# Patient Record
Sex: MALE | Race: WHITE | NOT HISPANIC OR LATINO | URBAN - METROPOLITAN AREA
[De-identification: names, ages, dates, MRNs, and addresses within clinical notes are randomized per-mention and may not be internally consistent; named-entity substitution may affect disease eponyms.]

---

## 2017-01-13 ENCOUNTER — AMBULATORY SURGICAL CENTER (OUTPATIENT)
Dept: URBAN - METROPOLITAN AREA SURGERY 3 | Facility: SURGERY | Age: 27
End: 2017-01-13
Payer: COMMERCIAL

## 2017-01-13 ENCOUNTER — OFFICE (OUTPATIENT)
Dept: URBAN - METROPOLITAN AREA CLINIC 11 | Facility: CLINIC | Age: 27
End: 2017-01-13

## 2017-01-13 ENCOUNTER — AMBULATORY SURGICAL CENTER (OUTPATIENT)
Dept: URBAN - METROPOLITAN AREA SURGERY 3 | Facility: SURGERY | Age: 27
End: 2017-01-13

## 2017-01-13 VITALS
WEIGHT: 227 LBS | HEART RATE: 55 BPM | SYSTOLIC BLOOD PRESSURE: 111 MMHG | RESPIRATION RATE: 16 BRPM | HEART RATE: 55 BPM | RESPIRATION RATE: 14 BRPM | RESPIRATION RATE: 16 BRPM | DIASTOLIC BLOOD PRESSURE: 62 MMHG | DIASTOLIC BLOOD PRESSURE: 82 MMHG | OXYGEN SATURATION: 98 % | OXYGEN SATURATION: 97 % | TEMPERATURE: 96.7 F | HEART RATE: 54 BPM | HEART RATE: 63 BPM | TEMPERATURE: 97.8 F | RESPIRATION RATE: 18 BRPM | HEART RATE: 53 BPM | HEIGHT: 73 IN | DIASTOLIC BLOOD PRESSURE: 75 MMHG | DIASTOLIC BLOOD PRESSURE: 82 MMHG | HEART RATE: 54 BPM | RESPIRATION RATE: 18 BRPM | SYSTOLIC BLOOD PRESSURE: 111 MMHG | TEMPERATURE: 96.7 F | HEART RATE: 53 BPM | RESPIRATION RATE: 14 BRPM | OXYGEN SATURATION: 97 % | SYSTOLIC BLOOD PRESSURE: 126 MMHG | DIASTOLIC BLOOD PRESSURE: 52 MMHG | DIASTOLIC BLOOD PRESSURE: 65 MMHG | SYSTOLIC BLOOD PRESSURE: 126 MMHG | TEMPERATURE: 97.8 F | HEIGHT: 73 IN | DIASTOLIC BLOOD PRESSURE: 62 MMHG | HEART RATE: 62 BPM | DIASTOLIC BLOOD PRESSURE: 75 MMHG | SYSTOLIC BLOOD PRESSURE: 139 MMHG | DIASTOLIC BLOOD PRESSURE: 52 MMHG | OXYGEN SATURATION: 98 % | SYSTOLIC BLOOD PRESSURE: 139 MMHG | HEART RATE: 62 BPM | WEIGHT: 227 LBS | HEART RATE: 63 BPM | DIASTOLIC BLOOD PRESSURE: 65 MMHG

## 2017-01-13 DIAGNOSIS — K62.5 HEMORRHAGE OF ANUS AND RECTUM: ICD-10-CM

## 2017-01-13 DIAGNOSIS — K64.1 SECOND DEGREE HEMORRHOIDS: ICD-10-CM

## 2017-01-13 PROCEDURE — 45331 SIGMOIDOSCOPY AND BIOPSY: CPT | Performed by: INTERNAL MEDICINE

## 2017-01-13 PROCEDURE — 88305 TISSUE EXAM BY PATHOLOGIST: CPT | Performed by: INTERNAL MEDICINE

## 2022-12-27 ENCOUNTER — APPOINTMENT (RX ONLY)
Dept: URBAN - METROPOLITAN AREA CLINIC 74 | Facility: CLINIC | Age: 32
Setting detail: DERMATOLOGY
End: 2022-12-27

## 2022-12-27 DIAGNOSIS — D492 NEOPLASM OF UNSPECIFIED NATURE OF BONE, SOFT TISSUE, AND SKIN: ICD-10-CM

## 2022-12-27 DIAGNOSIS — L30.9 DERMATITIS, UNSPECIFIED: ICD-10-CM

## 2022-12-27 PROBLEM — R22.2 LOCALIZED SWELLING, MASS AND LUMP, TRUNK: Status: ACTIVE | Noted: 2022-12-27

## 2022-12-27 PROCEDURE — ? FULL BODY SKIN EXAM - DECLINED

## 2022-12-27 PROCEDURE — 99204 OFFICE O/P NEW MOD 45 MIN: CPT

## 2022-12-27 PROCEDURE — ? SURGICAL DECISION MAKING

## 2022-12-27 PROCEDURE — ? PRESCRIPTION MEDICATION MANAGEMENT

## 2022-12-27 PROCEDURE — ? ADDITIONAL NOTES

## 2022-12-27 PROCEDURE — ? COUNSELING

## 2022-12-27 PROCEDURE — ? PRESCRIPTION

## 2022-12-27 RX ORDER — HYDROCORTISONE 25 MG/G
CREAM TOPICAL
Qty: 30 | Refills: 0 | Status: ERX | COMMUNITY
Start: 2022-12-27

## 2022-12-27 RX ORDER — CICLOPIROX OLAMINE 7.7 MG/G
CREAM TOPICAL
Qty: 30 | Refills: 0 | Status: ERX | COMMUNITY
Start: 2022-12-27

## 2022-12-27 RX ADMIN — CICLOPIROX OLAMINE: 7.7 CREAM TOPICAL at 00:00

## 2022-12-27 RX ADMIN — HYDROCORTISONE: 25 CREAM TOPICAL at 00:00

## 2022-12-27 ASSESSMENT — LOCATION DETAILED DESCRIPTION DERM
LOCATION DETAILED: RIGHT DORSAL SHAFT OF PENIS
LOCATION DETAILED: PERIUMBILICAL SKIN
LOCATION DETAILED: SUPRAPUBIC SKIN

## 2022-12-27 ASSESSMENT — LOCATION ZONE DERM
LOCATION ZONE: TRUNK
LOCATION ZONE: PENIS

## 2022-12-27 ASSESSMENT — LOCATION SIMPLE DESCRIPTION DERM
LOCATION SIMPLE: ABDOMEN
LOCATION SIMPLE: GROIN
LOCATION SIMPLE: PENIS

## 2022-12-27 NOTE — PROCEDURE: ADDITIONAL NOTES
Detail Level: Simple
Render Risk Assessment In Note?: no
Additional Notes: Patient states he has over 5 CT scans in the last week with significant contrast load and has not been feeling well since. Counseled certain rashes like FDE that likes to form on glans penis can be related to a number of medications, counseled appearance is not consistent with that at this time, but will monitor. 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 Rash contained to folds. Will empirically treat for intertrigo with ciclopirox and HC2.5% counseled on overuse of HC and skin thinning. If not improved discussed biopsy. Patient counseled skin lesions are not likely the source of his other concerns. Advised to seek urgent PCP appointment or return to ED if concerned. Patient noted weight loss, SOB and night sweats. Patient has been to several doctors and counseled on the importance of following up with PCP to review all tests that have been done to prevent over testing and exposure to more contrast. Offered to order screening labs but due to concern of other systemic systems stressed importance of seeing an internist or primary care doctor for follow up. Patient agreeed. Noted to have minor shotty notes bilateral inguinal folds. Recommend US and PCP evaluation.
Additional Notes: Counseled lesions are more superficial than expected for lymph nodes and more likely to be several lipomas that are now noted due to weight loss. However unable to tell until surgically examining specimen. Recommend US with pcp to evaluate before surgical procedure as part of further workup for patients concerning symptoms. Patient advised to see pcp asap for weight loss, sob and fatigue. Patient is worried about lymphoma and advised this needs to be evaluated. Patient is established with pcp. Offered to order us today but due to multiple other concerns that seem more pressing recommend PCP follow up so all required tests can be ordered together.

## 2022-12-27 NOTE — HPI: SKIN LESION
What Type Of Note Output Would You Prefer (Optional)?: Bullet Format
How Severe Is Your Skin Lesion?: mild
Has Your Skin Lesion Been Treated?: not been treated
Is This A New Presentation, Or A Follow-Up?: Growth
Additional History: 35 pounds over 3 months, not intentional.

## 2022-12-27 NOTE — PROCEDURE: PRESCRIPTION MEDICATION MANAGEMENT
Render In Strict Bullet Format?: No
Initiate Treatment: hydrocortisone 2.5 % topical cream \\nQuantity: 30.0 g  Days Supply: 30\\nSig: Mix pea sized amount with cicloprox cream and apply affected area 1-2 times a day x 2 weeks,\\n\\nciclopirox 0.77 % topical cream \\nQuantity: 90.0 g  Days Supply: 30\\nSig: Apply tin coat to affected areas 1-2 times a day after cleansing.
Detail Level: Zone

## 2022-12-27 NOTE — PROCEDURE: SURGICAL DECISION MAKING
Risk Assessment Explanation (Does Not Render In The Note): Clinical determination of the probability and/or consequences of an event, such as surgery. Clinical assessment of the level of risk is affected by the nature of the event under consideration for the patient. Modifier 57 is used to indicate an Evaluation and Management (E/M) service resulted in the initial decision to perform surgery either the day before a major surgery (90 day global) or the day of a major surgery.
Discussion: All benefits and risks of procedure were discussed including but not limited to pain, bleeding, infection, scarring, burning, nerve damage, pigmentary change, tendon damage, loss of function.
Initial Decision For Surgery?: Yes
Complexity (Necessary For Coding; Major - 90 Day Global With Some Exceptions; Minor - 10 Day Global): minor

## 2023-01-17 ENCOUNTER — APPOINTMENT (RX ONLY)
Dept: URBAN - METROPOLITAN AREA CLINIC 74 | Facility: CLINIC | Age: 33
Setting detail: DERMATOLOGY
End: 2023-01-17

## 2023-01-17 DIAGNOSIS — L21.8 OTHER SEBORRHEIC DERMATITIS: ICD-10-CM

## 2023-01-17 DIAGNOSIS — L30.9 DERMATITIS, UNSPECIFIED: ICD-10-CM | Status: RESOLVING

## 2023-01-17 DIAGNOSIS — D492 NEOPLASM OF UNSPECIFIED NATURE OF BONE, SOFT TISSUE, AND SKIN: ICD-10-CM

## 2023-01-17 PROBLEM — R22.42 LOCALIZED SWELLING, MASS AND LUMP, LEFT LOWER LIMB: Status: ACTIVE | Noted: 2023-01-17

## 2023-01-17 PROBLEM — R22.41 LOCALIZED SWELLING, MASS AND LUMP, RIGHT LOWER LIMB: Status: ACTIVE | Noted: 2023-01-17

## 2023-01-17 PROBLEM — R22.2 LOCALIZED SWELLING, MASS AND LUMP, TRUNK: Status: ACTIVE | Noted: 2023-01-17

## 2023-01-17 PROCEDURE — ? COUNSELING

## 2023-01-17 PROCEDURE — ? PRESCRIPTION MEDICATION MANAGEMENT

## 2023-01-17 PROCEDURE — ? ADDITIONAL NOTES

## 2023-01-17 PROCEDURE — ? FULL BODY SKIN EXAM - DECLINED

## 2023-01-17 PROCEDURE — ? ORDER ULTRASOUND

## 2023-01-17 PROCEDURE — ? SURGICAL DECISION MAKING

## 2023-01-17 PROCEDURE — 99214 OFFICE O/P EST MOD 30 MIN: CPT

## 2023-01-17 ASSESSMENT — LOCATION DETAILED DESCRIPTION DERM
LOCATION DETAILED: SUPRAPUBIC SKIN
LOCATION DETAILED: LEFT INFERIOR MEDIAL MALAR CHEEK
LOCATION DETAILED: PERIUMBILICAL SKIN
LOCATION DETAILED: RIGHT ANTERIOR PROXIMAL THIGH
LOCATION DETAILED: LEFT ANTERIOR PROXIMAL THIGH
LOCATION DETAILED: RIGHT DORSAL SHAFT OF PENIS
LOCATION DETAILED: LEFT INGUINAL CREASE

## 2023-01-17 ASSESSMENT — LOCATION SIMPLE DESCRIPTION DERM
LOCATION SIMPLE: ABDOMEN
LOCATION SIMPLE: LEFT THIGH
LOCATION SIMPLE: RIGHT THIGH
LOCATION SIMPLE: LEFT CHEEK
LOCATION SIMPLE: GROIN
LOCATION SIMPLE: PENIS

## 2023-01-17 ASSESSMENT — LOCATION ZONE DERM
LOCATION ZONE: FACE
LOCATION ZONE: TRUNK
LOCATION ZONE: PENIS
LOCATION ZONE: LEG

## 2023-01-17 NOTE — PROCEDURE: ADDITIONAL NOTES
Additional Notes: Patient still feeling unwell and losing weight. Has follow up with [facility] for second option counseled weight loss is concerning and glad her is getting another opinion. Counseled lesions don’t appear to be lymph nodes and more likely to be fibro fatty tissue or small lipomas. Counseled may be more able to feel these because of weight loss. Counseled will order ultrasound to avoid any contrast exposure but ultrasound is not a perfect test. Additional Notes: Patient still feeling unwell and losing weight. Has follow up with [facility] for second option counseled weight loss is concerning and glad her is getting another opinion. Counseled lesions don’t appear to be lymph nodes and more likely to be fibro fatty tissue or small lipomas. Counseled may be more able to feel these because of weight loss. Counseled will order ultrasound to avoid any contrast exposure but ultrasound is not a perfect test.

## 2023-01-17 NOTE — PROCEDURE: PRESCRIPTION MEDICATION MANAGEMENT
Render In Strict Bullet Format?: No
Continue Regimen: ciclopirox 0.77 % topical cream  to inguinal folds.
Discontinue Regimen: HC 2.5%
Plan: Skin on glans looks completely normal today. Recommend discontinuing all treatments
Detail Level: Zone
Initiate Treatment: hydrocortisone 2.5 % topical cream \\nQuantity: 30.0 g  Days Supply: 30\\nSig: Mix a small amount  with cicloprox cream and apply affected area bid for 2 weeks, then stop\\n\\nciclopirox 0.77 % topical cream \\nQuantity: 30.0 g  Days Supply: 30\\nSig: Mix a small amount  with hydrocortisone cream and apply to affected area bid for 2 weeks, then stop

## 2023-01-17 NOTE — PROCEDURE: ORDER ULTRASOUND
Detail Level: Simple
Lesion Location: inside of right and left thighs
Ultrasound Protocol: Ultrasound of Subcutaneous Mass
Provider: Mandie Clarke MD
Priority: normal
Subcutaneous Lesion Ultrasound Reason: Keloid vs lymph nodes

## 2023-01-17 NOTE — PROCEDURE: ADDITIONAL NOTES
Additional Notes: Counseled lesions are more superficial than expected for lymph nodes and more likely to be several lipomas or normal fibrofatty tissue  that are is noted due to weight loss. However unable to tell until surgically examining specimen. Patient has seen pcp but wanting MRI Patient prefers ultrasound will order to screen today for enlarged lymph nodes, counseled this test is not very specific and may need further follow up. Recommended following advice of pcp

## 2023-02-07 ENCOUNTER — APPOINTMENT (RX ONLY)
Dept: URBAN - METROPOLITAN AREA CLINIC 74 | Facility: CLINIC | Age: 33
Setting detail: DERMATOLOGY
End: 2023-02-07

## 2023-02-07 DIAGNOSIS — K13.0 DISEASES OF LIPS: ICD-10-CM

## 2023-02-07 DIAGNOSIS — L29.8 OTHER PRURITUS: ICD-10-CM

## 2023-02-07 DIAGNOSIS — D492 NEOPLASM OF UNSPECIFIED NATURE OF BONE, SOFT TISSUE, AND SKIN: ICD-10-CM

## 2023-02-07 DIAGNOSIS — L29.89 OTHER PRURITUS: ICD-10-CM

## 2023-02-07 PROBLEM — R22.2 LOCALIZED SWELLING, MASS AND LUMP, TRUNK: Status: ACTIVE | Noted: 2023-02-07

## 2023-02-07 PROBLEM — L29.9 PRURITUS, UNSPECIFIED: Status: ACTIVE | Noted: 2023-02-07

## 2023-02-07 PROBLEM — R22.41 LOCALIZED SWELLING, MASS AND LUMP, RIGHT LOWER LIMB: Status: ACTIVE | Noted: 2023-02-07

## 2023-02-07 PROBLEM — R22.42 LOCALIZED SWELLING, MASS AND LUMP, LEFT LOWER LIMB: Status: ACTIVE | Noted: 2023-02-07

## 2023-02-07 PROCEDURE — ? PRESCRIPTION MEDICATION MANAGEMENT

## 2023-02-07 PROCEDURE — ? COUNSELING

## 2023-02-07 PROCEDURE — ? ADDITIONAL NOTES

## 2023-02-07 PROCEDURE — ? FULL BODY SKIN EXAM - DECLINED

## 2023-02-07 PROCEDURE — ? ORDER TESTS

## 2023-02-07 PROCEDURE — 99214 OFFICE O/P EST MOD 30 MIN: CPT

## 2023-02-07 PROCEDURE — ? ORDER ULTRASOUND

## 2023-02-07 ASSESSMENT — LOCATION DETAILED DESCRIPTION DERM
LOCATION DETAILED: LEFT INFERIOR VERMILION LIP
LOCATION DETAILED: RIGHT ORAL COMMISSURE
LOCATION DETAILED: PERIUMBILICAL SKIN
LOCATION DETAILED: RIGHT ANTERIOR PROXIMAL THIGH
LOCATION DETAILED: LEFT ANTERIOR PROXIMAL THIGH

## 2023-02-07 ASSESSMENT — LOCATION ZONE DERM
LOCATION ZONE: LIP
LOCATION ZONE: TRUNK
LOCATION ZONE: LEG

## 2023-02-07 ASSESSMENT — LOCATION SIMPLE DESCRIPTION DERM
LOCATION SIMPLE: LEFT THIGH
LOCATION SIMPLE: RIGHT THIGH
LOCATION SIMPLE: RIGHT LIP
LOCATION SIMPLE: ABDOMEN
LOCATION SIMPLE: LEFT LIP

## 2023-02-07 NOTE — PROCEDURE: PRESCRIPTION MEDICATION MANAGEMENT
Render In Strict Bullet Format?: No
Detail Level: Zone
Plan: Start aveeno eczema bid cream
Defer Treatment (Provide Reason For Deferment In Text Field Below): Gabapentin - patient prefers to have nutrient levels checked first for organic cause of his problems, if normal will consider of gabapentin for skin itching.
Initiate Treatment: Ciclopirox and hc 2.5 mix together and apply nightly for 10 days

## 2023-02-07 NOTE — PROCEDURE: ORDER TESTS
Performing Laboratory: [id number]
Lab Facility: [id number]
Bill For Surgical Tray: no
Billing Type: Third-Party Bill
Expected Date Of Service: 02/07/2023

## 2023-02-07 NOTE — PROCEDURE: ADDITIONAL NOTES
Detail Level: Simple
Additional Notes: Counseled lesions are more superficial than expected for lymph nodes and more likely to be normal fibrofatty tissue  are noted due to weight loss. No growth since last visit, reassured feels like normal fascial tissue. Patient has seen pcp but wanting MRI Patient prefers ultrasound will order to screen today for enlarged lymph nodes, counseled this test is not very specific and may need further follow up. Recommended following advice of pcp. Patient had not completed ultrasound. Will get that done this week.
Render Risk Assessment In Note?: no
Additional Notes: Vitamin deficiency labs ordered due to new onset seb derm and angular cheilitis in the setting of weight loss. Will also repeat thyroid labs due to palpitations, itching and weight loss to screen for hyperthyroidism.
Additional Notes: Reviewed prior labs in December. TSH wnl but due to high amounts of contrast will recheck. Patient notes burning itching more consistent with nerve abnormality. Weight loss has stabilized due to increased amount of food intake. Counseled good this has stabilized but with new angular chelitis, weight loss, and seb derm will screen for nutrient deficiency.

## 2023-05-31 ENCOUNTER — APPOINTMENT (RX ONLY)
Dept: URBAN - METROPOLITAN AREA CLINIC 74 | Facility: CLINIC | Age: 33
Setting detail: DERMATOLOGY
End: 2023-05-31

## 2023-05-31 DIAGNOSIS — D492 NEOPLASM OF UNSPECIFIED NATURE OF BONE, SOFT TISSUE, AND SKIN: ICD-10-CM | Status: STABLE

## 2023-05-31 DIAGNOSIS — L30.9 DERMATITIS, UNSPECIFIED: ICD-10-CM

## 2023-05-31 PROBLEM — R22.42 LOCALIZED SWELLING, MASS AND LUMP, LEFT LOWER LIMB: Status: ACTIVE | Noted: 2023-05-31

## 2023-05-31 PROBLEM — R22.41 LOCALIZED SWELLING, MASS AND LUMP, RIGHT LOWER LIMB: Status: ACTIVE | Noted: 2023-05-31

## 2023-05-31 PROCEDURE — ? PRESCRIPTION MEDICATION MANAGEMENT

## 2023-05-31 PROCEDURE — ? COUNSELING

## 2023-05-31 PROCEDURE — ? ADDITIONAL NOTES

## 2023-05-31 PROCEDURE — 99214 OFFICE O/P EST MOD 30 MIN: CPT

## 2023-05-31 PROCEDURE — ? PRESCRIPTION

## 2023-05-31 PROCEDURE — ? FULL BODY SKIN EXAM - DECLINED

## 2023-05-31 RX ORDER — TRIAMCINOLONE ACETONIDE 1 MG/G
CREAM TOPICAL BID
Qty: 80 | Refills: 1 | Status: ERX | COMMUNITY
Start: 2023-05-31

## 2023-05-31 RX ADMIN — TRIAMCINOLONE ACETONIDE: 1 CREAM TOPICAL at 00:00

## 2023-05-31 ASSESSMENT — LOCATION DETAILED DESCRIPTION DERM
LOCATION DETAILED: SUPRAPUBIC SKIN
LOCATION DETAILED: LEFT LATERAL UPPER BACK
LOCATION DETAILED: LEFT INGUINAL CREASE
LOCATION DETAILED: LEFT MID-UPPER BACK
LOCATION DETAILED: LEFT MID-UPPER BACK
LOCATION DETAILED: LEFT ANTERIOR PROXIMAL THIGH
LOCATION DETAILED: LEFT ANTERIOR PROXIMAL THIGH
LOCATION DETAILED: RIGHT ANTERIOR PROXIMAL THIGH

## 2023-05-31 ASSESSMENT — LOCATION SIMPLE DESCRIPTION DERM
LOCATION SIMPLE: LEFT UPPER BACK
LOCATION SIMPLE: GROIN
LOCATION SIMPLE: LEFT UPPER BACK
LOCATION SIMPLE: LEFT THIGH
LOCATION SIMPLE: LEFT THIGH
LOCATION SIMPLE: RIGHT THIGH

## 2023-05-31 ASSESSMENT — LOCATION ZONE DERM
LOCATION ZONE: LEG
LOCATION ZONE: TRUNK
LOCATION ZONE: LEG
LOCATION ZONE: TRUNK

## 2023-05-31 NOTE — PROCEDURE: ADDITIONAL NOTES
Render Risk Assessment In Note?: no
Additional Notes: Patient extremely reactive with dermatographism. Recommend starting and antihistamine and can consider seeing an allergist. May be having a systemic contact dermatitis. All skin itching started approx 7 months ago when he moved to this area. If not responding to antihistamine recommend this as next step for dermtographism. Labs reviewed in detail and within normal limits
Detail Level: Simple
Additional Notes: Counseled lesions are more superficial than expected for lymph nodes and more likely to be normal fibrofatty tissue  are noted due to weight loss. No growth since last visit, reassured feels like normal fascial tissue. Patient has seen pcp but wanting MRI Patient prefers ultrasound will order to screen today for enlarged lymph nodes, counseled this test is not very specific and may need further follow up. Recommended following advice of pcp. Patient had not completed ultrasound. Will get that done this week.

## 2023-05-31 NOTE — PROCEDURE: PRESCRIPTION MEDICATION MANAGEMENT
Plan: Increase moisturization, start allegra 180 mg, decrease temp of showers. \\nCounseled may be nummular eczema vs tinea but favor eczema. Will treat empirically if not improved stop medication and can biopsy. Offered biopsy today but patient prefers empiric treatment. Reassured did not appear to be lymphoma cutis.
Render In Strict Bullet Format?: No
Initiate Treatment: Triamcinolone to AA bid for 2 weeks
Detail Level: Detailed